# Patient Record
Sex: FEMALE | Race: WHITE
[De-identification: names, ages, dates, MRNs, and addresses within clinical notes are randomized per-mention and may not be internally consistent; named-entity substitution may affect disease eponyms.]

---

## 2020-03-29 ENCOUNTER — HOSPITAL ENCOUNTER (EMERGENCY)
Dept: HOSPITAL 96 - M.ERS | Age: 85
Discharge: HOME | End: 2020-03-29
Payer: MEDICARE

## 2020-03-29 VITALS — SYSTOLIC BLOOD PRESSURE: 134 MMHG | DIASTOLIC BLOOD PRESSURE: 59 MMHG

## 2020-03-29 VITALS — BODY MASS INDEX: 22.47 KG/M2 | WEIGHT: 119.01 LBS | HEIGHT: 61 IN

## 2020-03-29 DIAGNOSIS — I10: ICD-10-CM

## 2020-03-29 DIAGNOSIS — Z90.49: ICD-10-CM

## 2020-03-29 DIAGNOSIS — K21.9: ICD-10-CM

## 2020-03-29 DIAGNOSIS — J40: Primary | ICD-10-CM

## 2020-03-30 NOTE — EKG
[place] [address]
Phone:  [phone]                     ELECTROCARDIOGRAM REPORT      
_______________________________________________________________________________
 
Name:         VEE MCNEIL              Room:                     [facility]#:    [id number]     Account #:     [id number]  
Admission:    20    Attend Phys:                     
Discharge:    20    Date of Birth: [birth year]  
Date of Service: 20 1700  Report #:      [id number]
        [id number]
_______________________________________________________________________________
THIS REPORT FOR:  //name//                      
 
                         [facility] ED
                                       
Test Date:    2020               Test Time:    17:00:00
Pat Name:     VEE MCNEIL           Department:   
Patient ID:   [id number]            Room:          
Gender:       F                        Technician:   AIDAN
:          [id number]               Requested By: Jeff Cornelius
Order Number: [id number] MD:   Chris Ferguson
                                 Measurements
Intervals                              Axis          
Rate:         72                       P:            52
OR:           162                      QRS:          -44
QRSD:         121                      T:            90
QT:           397                                    
QTc:          435                                    
                           Interpretive Statements
Sinus rhythm
left axis
nonspecific t wave changes
Compared to ECG 2009 09:59:21
 
Sinus bradycardia no longer present
Atrial premature complex(es) no longer present
 
Electronically Signed On 3- 10:31:53 CDT by Chris Ferguson
[email or web address]
 
 
 
 
 
 
 
 
 
 
 
 
 
 
 
 
  <ELECTRONICALLY SIGNED>
                                           By: Chris Ferguson MD, FAC      
  20     1031
D: 20 1700   _____________________________________
T: 20 1700   Chris Ferguson MD, [facility]        /EPI

## 2020-08-03 ENCOUNTER — HOSPITAL ENCOUNTER (OUTPATIENT)
Dept: HOSPITAL 96 - M.CRD | Age: 85
End: 2020-08-03
Attending: FAMILY MEDICINE
Payer: MEDICARE

## 2020-08-03 DIAGNOSIS — I08.3: Primary | ICD-10-CM

## 2020-08-03 NOTE — 2DMMODE
[place] [address]
Phone:  [phone] 2 D/M-MODE ECHOCARDIOGRAM     
_______________________________________________________________________________
 
Name:         TARUNNECARO BOSWELL              Room:                     REG CLI
M.R.#:    [id number]     Account #:     [id number]  
Admission:    20    Attend Phys:   Rafael Madrigal, 
Discharge:                Date of Birth: [birth year]  
Date of Service: 20 1105  Report #:      [id number]
        [id number]
_______________________________________________________________________________
THIS REPORT FOR:
 
cc:  Rafael Madrigal John E. DO Blick, David R. MD [facility]        
                                                                       ~
 
--------------- APPROVED REPORT --------------
 
 
Study performed:  2020 08:50:18
 
EXAM: Comprehensive 2D, Doppler, and color-flow 
Echocardiogram 
 
      BSA:         1.49
HR: 65 bpm BP:          132/72 mmHg 
 
Other Information 
Study Quality: Good
 
Indications
Non Rheumatic Pulmonary Stenosis
 
2D Dimensions
IVSd:  9.13 (7-11mm) LVOT Diam:  20.80 (18-24mm) 
LVDd:  42.44 mm  
PWd:  10.30 (7-11mm) Ascending Ao:  30.99 (22-36mm)
LVDs:  25.99 (25-40mm) 
Aortic Root:  29.75 mm 
 
Volumes
Left Atrial Volume (Systole) 
    LA ESV Index:  24.70 mL/m2
 
Aortic Valve
AoV Peak Luis.:  1.81 m/s 
AO Peak Gr.:  13.09 mmHg LVOT Max P.47 mmHg
AO Mean Gr.:  7.34 mmHg  LVOT Mean P.27 mmHg
    LVOT Max V:  0.79 m/s
AO V2 VTI:  40.54 cm  LVOT Mean V:  0.52 m/s
KVNG (VTI):  1.47 cm2  LVOT V1 VTI:  17.58 cm
 
Mitral Valve
    E/A Ratio:  1.91
    MV Decel. Time:  145.04 ms
 
 
[place] [address]
Phone:  [phone]                     2 D/M-MODE ECHOCARDIOGRAM     
_______________________________________________________________________________
 
Name:         VEE MCNEIL              Room:                     REG CLI
M.R.#:    [id number]     Account #:     [id number]  
Admission:    20    Attend Phys:   Rafael Madrigal, 
Discharge:                Date of Birth: [birth year]  
Date of Service: 20 1105  Report #:      [id number]
        [id number]
_______________________________________________________________________________
MV E Max Luis.:  1.43 m/s 
MV PHT:  42.06 ms  
MVA (PHT):  5.23 cm2  
 
TDI
E/Lateral E':  13.00 E/Medial E':  13.00
   Medial E' Luis.:  0.11 m/s
   Lateral E' Luis.:  0.11 m/s
 
Pulmonary Valve
PV Peak Luis.:  1.33 m/s PV Peak Gr.:  7.06 mmHg
 
Tricuspid Valve
    RAP Estimate:  5.00 mmHg
TR Peak Gr.:  31.85 mmHg RVSP:  36.85 mmHg
    PA Pressure:  36.85 mmHg
 
Left Ventricle
The left ventricle is normal size. There is normal LV segmental wall 
motion. There is normal left ventricular wall thickness. Left 
ventricular systolic function is normal. The left ventricular 
ejection fraction is within the normal range. LVEF is 55-60%. The 
left ventricular diastolic function is normal.
 
Right Ventricle
The right ventricle is normal size. The right ventricular systolic 
function is normal.
 
Atria
The left atrium size is normal. The right atrium size is 
normal.
 
Aortic Valve
Moderate aortic valve sclerosis. Mild aortic regurgitation. There is 
no aortic valvular stenosis. 
 
Mitral Valve
Mitral valve leaflets are mildly thickened. Mild mitral annular 
calcification. Moderate mitral regurgitation. No evidence of mitral 
valve stenosis.
 
Tricuspid Valve
The tricuspid valve is normal in structure. Mild tricuspid 
regurgitation. estimated pa pressure 40 mm Hg
 
Pulmonic Valve
 
 
[place] [address]
Phone:  [phone]                     2 D/M-MODE ECHOCARDIOGRAM     
_______________________________________________________________________________
 
Name:         VEE MCNEIL ANDREIA              Room:                     REG CLI
M.R.#:    [id number]     Account #:     [id number]  
Admission:    20    Attend Phys:   Rafael Madrigal, 
Discharge:                Date of Birth: [birth year]  
Date of Service: 20 1105  Report #:      [id number]
        [id number]
_______________________________________________________________________________
Pulmonic valve is not well visualized. There is trace pulmonic 
valvular regurgitation.
 
Great Vessels
The aortic root is normal in size. IVC is normal in size and 
collapses >50% with inspiration.
 
Pericardium
There is no pericardial effusion.
 
<Conclusion>
LVEF is 55-60%.
Moderate aortic valve sclerosis.
Mild aortic regurgitation.
Moderate mitral regurgitation.
Mild tricuspid regurgitation.
estimated pa pressure 40 mm Hg
 
 
 
 
 
 
 
 
 
 
 
 
 
 
 
 
 
 
 
 
 
 
 
 
 
 
 
  <ELECTRONICALLY SIGNED>
                                           By: Chris Ferguson MD, [facility]      
  20     1105
D: 20 1105   _____________________________________
T: 20 1105   Chris Ferguson MD, FACC        /INF